# Patient Record
Sex: MALE | Race: WHITE | URBAN - METROPOLITAN AREA
[De-identification: names, ages, dates, MRNs, and addresses within clinical notes are randomized per-mention and may not be internally consistent; named-entity substitution may affect disease eponyms.]

---

## 2017-09-15 ENCOUNTER — TELEPHONE (OUTPATIENT)
Dept: ORTHOPEDIC SURGERY | Age: 42
End: 2017-09-15

## 2017-09-15 ENCOUNTER — OFFICE VISIT (OUTPATIENT)
Dept: ORTHOPEDIC SURGERY | Age: 42
End: 2017-09-15

## 2017-09-15 VITALS — BODY MASS INDEX: 24.25 KG/M2 | HEIGHT: 68 IN | WEIGHT: 160 LBS

## 2017-09-15 DIAGNOSIS — M21.822 HILL-SACHS LESION OF LEFT SHOULDER: ICD-10-CM

## 2017-09-15 DIAGNOSIS — S43.002A SUBLUXATION OF LEFT SHOULDER JOINT, INITIAL ENCOUNTER: ICD-10-CM

## 2017-09-15 PROCEDURE — 99203 OFFICE O/P NEW LOW 30 MIN: CPT | Performed by: PHYSICIAN ASSISTANT

## 2017-09-15 PROCEDURE — L3670 SO ACRO/CLAV CAN WEB PRE OTS: HCPCS | Performed by: PHYSICIAN ASSISTANT

## 2017-09-15 RX ORDER — SIMVASTATIN 40 MG
40 TABLET ORAL
COMMUNITY
Start: 2016-01-04

## 2017-11-07 NOTE — LETTER
To be performed within 30 days, unless otherwise noted. Patient Name: Mainor Espinal     [facility]      Fax to [phone] [address] [facility]    Fax to [phone]  YOB: 1975     [address] [facility]   Fax to [phone]                 Surgery confirmation #   Surgeon name:  Darryl Anaya.  Garrick Cedillo MD  Phone:  [phone]   Fax:  [phone]                  General/MAC/Regional Anesthesia Pre Admission Testing/Same Day Surgery RN to check if below criteria is met       ECG required - within 6 months of surgery if:           Diagnosis of CAD, arrhythmia, CHF, CRF, arterial vascular disease, pulmonary disease (except asthma), DM        PT/INR day of surgery required - if no documented INF of 1.1 or less within 48 hours of surgery if on Warfarin in the last 30 days       POCT Glucose day of surgery required - if diabetic       Potassium day of surgery required - if: 1) On dialysis or 2) Diagnosis of renal failure (not renal insufficiency or working transplant)       Urine pregnancy (Beta HCG if unable to void) on day of surgery, unless patient has negative serum pregnancy test within 7 days of  surgery           required if female with no history of hysterectomy and:     1) 11-55 years  2) Less than 11 years and has begun menses or 3) Greater than 55 years and less than one year post-menopausal       IV: Insert Peripheral IV day of surgery (and saline lock if needed per anesthesia)        Normal Saline @ 125 ml/hr (1000 ml bag) unless diagnosed with CRF then @ 50 ml/hr          Other IV ______________________    Local Anesthesia    Pre-operative consultation to evaluate for risk factors prior to surgery             per PCP, may use hospitalists if not available          per hospitalist   per surgeon: date ___________  __________________________________________________________________________________________________________________

## 2017-11-15 ENCOUNTER — TELEPHONE (OUTPATIENT)
Dept: ORTHOPEDIC SURGERY | Age: 42
End: 2017-11-15

## 2017-11-15 NOTE — ADDENDUM NOTE
Encounter addended by: Zach Schmidt MA on: 11/15/2017 10:20 AM<BR>    Actions taken: Letter status changed

## 2017-12-01 ENCOUNTER — HOSPITAL ENCOUNTER (OUTPATIENT)
Dept: PHYSICAL THERAPY | Age: 42
Discharge: HOME | End: 2017-12-31
Attending: ORTHOPAEDIC SURGERY | Admitting: ORTHOPAEDIC SURGERY

## 2017-12-12 ENCOUNTER — HOSPITAL ENCOUNTER (OUTPATIENT)
Dept: PHYSICAL THERAPY | Age: 42
Discharge: HOME | End: 2017-11-30
Admitting: ORTHOPAEDIC SURGERY

## 2017-12-12 ENCOUNTER — HOSPITAL ENCOUNTER (OUTPATIENT)
Dept: PHYSICAL THERAPY | Age: 42
Discharge: HOME | End: 2017-12-12
Admitting: ORTHOPAEDIC SURGERY

## 2017-12-12 ENCOUNTER — OFFICE VISIT (OUTPATIENT)
Dept: ORTHOPEDIC SURGERY | Age: 42
End: 2017-12-12

## 2017-12-12 VITALS — BODY MASS INDEX: 24.26 KG/M2 | WEIGHT: 160.05 LBS | HEIGHT: 68 IN

## 2017-12-12 DIAGNOSIS — Z98.890 S/P ARTHROSCOPY OF SHOULDER: ICD-10-CM

## 2017-12-12 DIAGNOSIS — M21.822 HILL-SACHS LESION OF LEFT SHOULDER: Primary | ICD-10-CM

## 2017-12-12 PROCEDURE — 99024 POSTOP FOLLOW-UP VISIT: CPT | Performed by: ORTHOPAEDIC SURGERY

## 2017-12-12 NOTE — PLAN OF CARE
[facility], [address]  Phone: [phone]  Fax [phone]     Physical Therapy Certification    Dear Referring Practitioner: Dr. Lalito Alston,    We had the pleasure of evaluating the following patient for physical therapy services at [address] [place] [address]. A summary of our findings can be found in the initial assessment below. This includes our plan of care. If you have any questions or concerns regarding these findings, please do not hesitate to contact me at the office phone number checked above. Thank you for the referral.       Physician Signature:_______________________________Date:__________________  By signing above (or electronic signature), therapists plan is approved by physician    Patient: Genny Sears   : 1975   MRN: [id number]  Referring Physician: Referring Practitioner: Dr. Lalito Alston      Evaluation Date: 2017      Medical Diagnosis Information:  Diagnosis: Z89.234 (ICD-10-CM) - Hill-Sachs lesion of left shoulder   Treatment Diagnosis: R shoulder pain (m25.512) s/p Latarjet-Nohemi procedure on L shoulder DOS 17                                         Insurance information: PT Insurance Information: BCBS    Precautions/ Contra-indications: See Latarjet protocol, 1 wk PO   Latex Allergy:  [x]NO      []YES  Preferred Language for Healthcare:   [x]English       []other:    SUBJECTIVE: Patient stated complaint: Initial shoulder injury was ~15 years ago snowboarding, then dislocated again skateboarding. Was a frequent dislocator. Pt states he stopped taking pain medication on Saturday. Uses ice machine frequently throughout the day. Saw Dr. Lalito Alston today who released him from the sling. Has zero pain most of the time.     Relevant Medical History:Initial L dislocation ~15 y/o  Functional Disability Index:PT G-Codes  Functional Assessment Tool Used: QuickDASH  Score: 70%   Functional Limitation: Carrying, moving and handling objects  Carrying, Moving and Handling Objects Current Status (): At least 60 percent but less than 80 percent impaired, limited or restricted  Carrying, Moving and Handling Objects Goal Status (): At least 20 percent but less than 40 percent impaired, limited or restricted    Pain Scale: 0-2/10  Easing factors: Ice  Provocative factors: sleeping, self care, positional changes, driving, lifting, overhead motion    Type: []Constant   [x]Intermittent  []Radiating []Localized []other:     Numbness/Tingling: Thumb and first finger    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    Living Status/Prior Level of Function: Independent with ADLs and IADLs; slow but independent now    OBJECTIVE:     ROM Left Right   Shoulder Flex 90 170   Shoulder Abd    Shoulder ER 20 deg @ neutral T5   Shoulder IR  T7                  Strength  Left Right   Shoulder Flex NT 5   Shoulder Scap NT 5   Shoulder ER NT 5   Shoulder IR NT 5               Reflexes/Sensation:   [x]Dermatomes/Myotomes intact    []Reflexes equal and normal bilaterally   []Other:    Joint mobility:   []Normal    [x]Hypo   []Hyper    Palpation: Tight through L UT,     Functional Mobility/Transfers: WFL    Posture: Good posture, slightly elevated L shoulder    Bandages/Dressings/Incisions:     Gait: (include devices/WB status): Normal gait                         [x] Patient history, allergies, meds reviewed. Medical chart reviewed. See intake form. Review Of Systems (ROS):  [x]Performed Review of systems (Integumentary, CardioPulmonary, Neurological) by intake and observation. Intake form has been scanned into medical record. Patient has been instructed to contact their primary care physician regarding ROS issues if not already being addressed at this time.       Co-morbidities/Complexities (which will affect course of rehabilitation):  [x]None           Arthritic conditions benefit from Dry needling     []other:     Prognosis/Rehab Potential:      []Excellent   [x]Good    []Fair   []Poor    Tolerance of evaluation/treatment:    []Excellent   [x]Good    []Fair   []Poor  Physical Therapy Evaluation Complexity Justification  [x] A history of present problem with:  [x] no personal factors and/or comorbidities that impact the plan of care;  []1-2 personal factors and/or comorbidities that impact the plan of care  []3 personal factors and/or comorbidities that impact the plan of care  [x] An examination of body systems using standardized tests and measures addressing any of the following: body structures and functions (impairments), activity limitations, and/or participation restrictions;:  [x] a total of 1-2 or more elements   [] a total of 3 or more elements   [] a total of 4 or more elements   [x] A clinical presentation with:  [] stable and/or uncomplicated characteristics   [x] evolving clinical presentation with changing characteristics  [] unstable and unpredictable characteristics;   [x] Clinical decision making of [x] low, [] moderate, [] high complexity using standardized patient assessment instrument and/or measurable assessment of functional outcome. [x] EVAL (LOW) 38659 (typically 20 minutes face-to-face)  [] EVAL (MOD) 61103 (typically 30 minutes face-to-face)  [] EVAL (HIGH) 34805 (typically 45 minutes face-to-face)  [] RE-EVAL       PLAN:  Frequency/Duration:  1-2 days per week for 10-12 Weeks:  INTERVENTIONS:  [x] Therapeutic exercise including: strength training, ROM, for Upper extremity and core   [x]  NMR activation and proprioception for UE, scap and Core   [x] Manual therapy as indicated for shoulder, scapula and spine to include: Dry Needling/IASTM, STM, PROM, Gr I-IV mobilizations, manipulation.    [x] Modalities as needed that may include: thermal agents, E-stim, Biofeedback, US, iontophoresis as indicated  [x] Patient education on joint protection, postural re-education, activity modification, progression of HEP. HEP instruction:(see flowsheet)    GOALS:  Patient stated goal: 'Have full mobility of arm and return to normal work function\"    Therapist goals for Patient:   Short Term Goals: To be achieved in: 2 weeks  1. Independent in HEP and progression per patient tolerance, in order to prevent re-injury. 2. Patient will have a decrease in pain to facilitate improvement in movement, function, and ADLs as indicated by Functional Deficits. Long Term Goals: To be achieved in: 12weeks  1. Disability index score of 35% or less for the [facility] to assist with reaching prior level of function. 2. Patient will demonstrate increased AROM to 160 deg flexion, 160 deg abduction, T4 ER, T8 IR to allow for proper joint functioning as indicated by patients Functional Deficits. 3. Patient will demonstrate an increase in Strength to 4+/5 into flexion, abduction, IR and ER to allow for proper functional mobility as indicated by patients Functional Deficits. 4. Patient will return to driving and reaching into cabinetswithout increased symptoms or restriction.    5. Patient will return to work related activities including lifting without any increased symptoms or restrictions.(patient specific functional goal)         Electronically signed by:  Erickson Headley, PT,DPT [id number]

## 2017-12-12 NOTE — FLOWSHEET NOTE
[facility], [address]  Phone: [phone]  Fax [phone]      Physical Therapy Daily Treatment Note  Date:  2017    Patient Name:  Natasha Lopez    :  1975  MRN: [id number]  Restrictions/Precautions:   Medical/Treatment Diagnosis Information:  Diagnosis: M21.822 (ICD-10-CM) - Hill-Sachs lesion of left shoulder  Treatment Diagnosis: R shoulder pain (m25.512) s/p Latarjet-Nohemi procedure on L shoulder DOS   Insurance/Certification information:  PT Insurance Information: [facility]  Physician Information:  Referring Practitioner: Dr. Stephen Tolbert of care signed (Y/N):     Date of Patient follow up with Physician: 18    G-Code (if applicable):      Date G-Code Applied:    PT G-Codes  Functional Assessment Tool Used: QuickDASH  Score: 70%   Functional Limitation: Carrying, moving and handling objects  Carrying, Moving and Handling Objects Current Status (): At least 60 percent but less than 80 percent impaired, limited or restricted  Carrying, Moving and Handling Objects Goal Status (): At least 20 percent but less than 40 percent impaired, limited or restricted    Progress Note: [x]  Yes  []  No  Next due by: Visit #10      Latex Allergy:  [x]NO      []YES  Preferred Language for Healthcare:   [x]English       []other:    Visit # Insurance Allowable Requires auth   1 60    [x]no        []yes:     Pain level:  0-2/10     SUBJECTIVE:  See eval    OBJECTIVE: See eval  Observation:   Test measurements:      RESTRICTIONS/PRECAUTIONS: PROM 90 deg flex, 20 deg ER @ 0 deg abd,; pt out of sling, advised to avoid active motion as able for first few weeks.     Exercises/Interventions:   Therapeutic Ex Sets/rep comments   UT stretch 30\" x 3    Scap squeeze 5\" x 15    Table slides flexion (90 deg) 10\" x 10    Self ER to neutral 10\" x 10    Elbow AROM 10x    Flexion iso 5\" x 10 Pt ed 5' Ice, POC, HEP,              Manual Intervention                                        NMR re-education                                                 Therapeutic Exercise and NMR EXR  [x] (00461) Provided verbal/tactile cueing for activities related to strengthening, flexibility, endurance, ROM  for improvements in scapular, scapulothoracic and UE control with self care, reaching, carrying, lifting, house/yardwork, driving/computer work.    [] (01205) Provided verbal/tactile cueing for activities related to improving balance, coordination, kinesthetic sense, posture, motor skill, proprioception  to assist with  scapular, scapulothoracic and UE control with self care, reaching, carrying, lifting, house/yardwork, driving/computer work. Therapeutic Activities:    [] (88425 or 98879) Provided verbal/tactile cueing for activities related to improving balance, coordination, kinesthetic sense, posture, motor skill, proprioception and motor activation to allow for proper function of scapular, scapulothoracic and UE control with self care, carrying, lifting, driving/computer work.      Home Exercise Program:    [x] (44415) Reviewed/Progressed HEP activities related to strengthening, flexibility, endurance, ROM of scapular, scapulothoracic and UE control with self care, reaching, carrying, lifting, house/yardwork, driving/computer work  [] (66336) Reviewed/Progressed HEP activities related to improving balance, coordination, kinesthetic sense, posture, motor skill, proprioception of scapular, scapulothoracic and UE control with self care, reaching, carrying, lifting, house/yardwork, driving/computer work      Manual Treatments:  PROM / STM / Oscillations-Mobs:  G-I, II, III, IV (PA's, Inf., Post.)  [] (72672) Provided manual therapy to mobilize soft tissue/joints of cervical/CT, scapular GHJ and UE for the purpose of modulating pain, promoting relaxation,  increasing ROM,

## 2017-12-14 ENCOUNTER — HOSPITAL ENCOUNTER (OUTPATIENT)
Dept: PHYSICAL THERAPY | Age: 42
Discharge: HOME | End: 2017-12-14
Admitting: ORTHOPAEDIC SURGERY

## 2017-12-14 NOTE — FLOWSHEET NOTE
[facility],  [address]  Phone: [phone]  Fax [phone]      Physical Therapy Daily Treatment Note  Date:  2017    Patient Name:  Jay Esquivel    :  1975  MRN: [id number]  Restrictions/Precautions:   Medical/Treatment Diagnosis Information:  Diagnosis: M21.822 (ICD-10-CM) - Hill-Sachs lesion of left shoulder  Treatment Diagnosis: R shoulder pain (m25.512) s/p Latarjet-Nohemi procedure on L shoulder DOS   Insurance/Certification information:  PT Insurance Information: [facility]  Physician Information:  Referring Practitioner: Dr. Jonathon Thomason of care signed (Y/N):     Date of Patient follow up with Physician: 18    G-Code (if applicable):      Date G-Code Applied:         Progress Note: [x]  Yes  []  No  Next due by: Visit #10      Latex Allergy:  [x]NO      []YES  Preferred Language for Healthcare:   [x]English       []other:    Visit # Insurance Allowable Requires auth   2 60    [x]no        []yes:     Pain level:  0/10     SUBJECTIVE:  Pt states he did the exercises and felt really good yesterday and today. Feels like his body is still reacting to protect the shoulder even though it has been operated on to avoid any dislocations. OBJECTIVE: See eval  Observation:    Test measurements:      RESTRICTIONS/PRECAUTIONS: PROM 90 deg flex, 20 deg ER @ 0 deg abd,; pt out of sling, advised to avoid active motion as able for first few weeks.     Exercises/Interventions:   Therapeutic Ex Sets/rep comments   UT stretch 30\" x 3    Levator stretch 30\" x 3    Scap squeeze 3\" x 10    Table slides flexion (90 deg) 10\" x 10    Self ER to neutral 10\" x 10    Shoulder shrug 5\" x 10         Supine cane press 2 x 10                                                 Pt ed 5' Ice, POC, HEP,              Manual Intervention     STM/TPR to UT, STM to bicep/lateral arm, PROM flex (<90) and ER (<20) 14' Guarding with ER today                                 NMR re-education     Isometrics flexion, ER and IR 5\" x 10                                           Therapeutic Exercise and NMR EXR  [x] (39310) Provided verbal/tactile cueing for activities related to strengthening, flexibility, endurance, ROM  for improvements in scapular, scapulothoracic and UE control with self care, reaching, carrying, lifting, house/yardwork, driving/computer work.    [] (05988) Provided verbal/tactile cueing for activities related to improving balance, coordination, kinesthetic sense, posture, motor skill, proprioception  to assist with  scapular, scapulothoracic and UE control with self care, reaching, carrying, lifting, house/yardwork, driving/computer work. Therapeutic Activities:    [] (23260 or 20130) Provided verbal/tactile cueing for activities related to improving balance, coordination, kinesthetic sense, posture, motor skill, proprioception and motor activation to allow for proper function of scapular, scapulothoracic and UE control with self care, carrying, lifting, driving/computer work.      Home Exercise Program:    [x] (42013) Reviewed/Progressed HEP activities related to strengthening, flexibility, endurance, ROM of scapular, scapulothoracic and UE control with self care, reaching, carrying, lifting, house/yardwork, driving/computer work  [] (25932) Reviewed/Progressed HEP activities related to improving balance, coordination, kinesthetic sense, posture, motor skill, proprioception of scapular, scapulothoracic and UE control with self care, reaching, carrying, lifting, house/yardwork, driving/computer work      Manual Treatments:  PROM / STM / Oscillations-Mobs:  G-I, II, III, IV (PA's, Inf., Post.)  [] (51241) Provided manual therapy to mobilize soft tissue/joints of cervical/CT, scapular GHJ and UE for the purpose of modulating pain, promoting relaxation,  increasing ROM, reducing/eliminating soft tissue Patient is progressing as expected towards functional goals listed. [] Progression is slowed due to complexities listed. [] Progression has been slowed due to co-morbidities. [x] Plan just implemented, too soon to assess goals progression  [] Other:     ASSESSMENT:  Pt was educated on range restrictions again and verbalized understanding and not to push table slide flexion at this point in time. He did well with added isometrics and gentle active motion.       Treatment/Activity Tolerance:  [x] Patient tolerated treatment well [] Patient limited by fatique  [] Patient limited by pain  [] Patient limited by other medical complications  [] Other:     Prognosis: [x] Good [] Fair  [] Poor    Patient Requires Follow-up: [x] Yes  [] No    PLAN: Cont to work on muscle re-education w/ isos and gentle AAROM  [x] Continue per plan of care [] Alter current plan (see comments)  [] Plan of care initiated [] Hold pending MD visit [] Discharge    Electronically signed by: Parth Burrows PT,DPT [id number]

## 2017-12-18 ENCOUNTER — HOSPITAL ENCOUNTER (OUTPATIENT)
Dept: PHYSICAL THERAPY | Age: 42
Discharge: HOME | End: 2017-12-18
Admitting: ORTHOPAEDIC SURGERY

## 2017-12-18 NOTE — FLOWSHEET NOTE
Therapeutic Exercise and NMR EXR  [x] (75459) Provided verbal/tactile cueing for activities related to strengthening, flexibility, endurance, ROM  for improvements in scapular, scapulothoracic and UE control with self care, reaching, carrying, lifting, house/yardwork, driving/computer work.    [] (20235) Provided verbal/tactile cueing for activities related to improving balance, coordination, kinesthetic sense, posture, motor skill, proprioception  to assist with  scapular, scapulothoracic and UE control with self care, reaching, carrying, lifting, house/yardwork, driving/computer work. Therapeutic Activities:    [] (74644 or 10055) Provided verbal/tactile cueing for activities related to improving balance, coordination, kinesthetic sense, posture, motor skill, proprioception and motor activation to allow for proper function of scapular, scapulothoracic and UE control with self care, carrying, lifting, driving/computer work.      Home Exercise Program:    [x] (22974) Reviewed/Progressed HEP activities related to strengthening, flexibility, endurance, ROM of scapular, scapulothoracic and UE control with self care, reaching, carrying, lifting, house/yardwork, driving/computer work  [] (06486) Reviewed/Progressed HEP activities related to improving balance, coordination, kinesthetic sense, posture, motor skill, proprioception of scapular, scapulothoracic and UE control with self care, reaching, carrying, lifting, house/yardwork, driving/computer work      Manual Treatments:  PROM / STM / Oscillations-Mobs:  G-I, II, III, IV (PA's, Inf., Post.)  [] (82862) Provided manual therapy to mobilize soft tissue/joints of cervical/CT, scapular GHJ and UE for the purpose of modulating pain, promoting relaxation,  increasing ROM, reducing/eliminating soft tissue swelling/inflammation/restriction, improving soft tissue extensibility and allowing for proper ROM for normal function with self care, reaching, carrying, lifting, house/yardwork, driving/computer work    Modalities:  PM/CP 15 min to L shoulder in long sitting    Charges:  Timed Code Treatment Minutes: 40   Total Treatment Minutes: 55     [] EVAL (LOW) 58295 (typically 20 minutes face-to-face)  [] EVAL (MOD) 57943 (typically 30 minutes face-to-face)  [] EVAL (HIGH) 18373 (typically 45 minutes face-to-face)  [] RE-EVAL     [x] GN(58322) x  2   [] IONTO  [] NMR (59029) x      [] VASO  [x] Manual (50443) x  1    [] Other:  [] TA x       [] Mech Traction (48059)  [] ES(attended) (42590)      [x] ES (un) (66320):     GOALS:  Patient stated goal: 'Have full mobility of arm and return to normal work function\"    Therapist goals for Patient:   Short Term Goals: To be achieved in: 2 weeks  1. Independent in HEP and progression per patient tolerance, in order to prevent re-injury. 2. Patient will have a decrease in pain to facilitate improvement in movement, function, and ADLs as indicated by Functional Deficits. Long Term Goals: To be achieved in: 12weeks  1. Disability index score of 35% or less for the [facility] to assist with reaching prior level of function. 2. Patient will demonstrate increased AROM to 160 deg flexion, 160 deg abduction, T4 ER, T8 IR to allow for proper joint functioning as indicated by patients Functional Deficits. 3. Patient will demonstrate an increase in Strength to 4+/5 into flexion, abduction, IR and ER to allow for proper functional mobility as indicated by patients Functional Deficits. 4. Patient will return to driving and reaching into cabinetswithout increased symptoms or restriction. 5. Patient will return to work related activities including lifting without any increased symptoms or restrictions.(patient specific functional goal)         Progression Towards Functional goals:  [] Patient is progressing as expected towards functional goals listed. [] Progression is slowed due to complexities listed.   [] Progression has been slowed due to co-morbidities. [x] Plan just implemented, too soon to assess goals progression  [] Other:     ASSESSMENT:  Pt did well with added TE challenges. Was advised to follow up with surgeon's office about driving.       Treatment/Activity Tolerance:  [x] Patient tolerated treatment well [] Patient limited by fatique  [] Patient limited by pain  [] Patient limited by other medical complications  [] Other:     Prognosis: [x] Good [] Fair  [] Poor    Patient Requires Follow-up: [x] Yes  [] No    PLAN: Cont to work on AROM  [x] Continue per plan of care [] Alter current plan (see comments)  [] Plan of care initiated [] Hold pending MD visit [] Discharge    Electronically signed by: Pricila Wen, PT,DPT [id number]

## 2017-12-20 ENCOUNTER — HOSPITAL ENCOUNTER (OUTPATIENT)
Dept: PHYSICAL THERAPY | Age: 42
Discharge: HOME | End: 2017-12-20
Admitting: ORTHOPAEDIC SURGERY

## 2017-12-20 NOTE — FLOWSHEET NOTE
Patient is progressing as expected towards functional goals listed. [] Progression is slowed due to complexities listed. [] Progression has been slowed due to co-morbidities. [x] Plan just implemented, too soon to assess goals progression  [] Other:     ASSESSMENT:  Pt did well with added side lying activities. Provided updated HEP for pt to focus on at home. Doing very well so far.     Treatment/Activity Tolerance:  [x] Patient tolerated treatment well [] Patient limited by fatique  [] Patient limited by pain  [] Patient limited by other medical complications  [] Other:     Prognosis: [x] Good [] Fair  [] Poor    Patient Requires Follow-up: [x] Yes  [] No    PLAN: Cont to work on motion  [x] Continue per plan of care [] Alter current plan (see comments)  [] Plan of care initiated [] Hold pending MD visit [] Discharge    Electronically signed by: Steven Hogue, PT,DPT [id number]

## 2017-12-27 ENCOUNTER — HOSPITAL ENCOUNTER (OUTPATIENT)
Dept: PHYSICAL THERAPY | Age: 42
Discharge: HOME | End: 2017-12-27
Admitting: ORTHOPAEDIC SURGERY

## 2017-12-27 NOTE — FLOWSHEET NOTE
Progression has been slowed due to co-morbidities. [x] Plan just implemented, too soon to assess goals progression  [] Other:     ASSESSMENT:  Pt doing very well with all exercises at this point in time. Should continue to progress well, was educated on avoiding catching/lifting his children.     Treatment/Activity Tolerance:  [x] Patient tolerated treatment well [] Patient limited by fatique  [] Patient limited by pain  [] Patient limited by other medical complications  [] Other:     Prognosis: [x] Good [] Fair  [] Poor    Patient Requires Follow-up: [x] Yes  [] No    PLAN:wall slides and tricep strengthening  [x] Continue per plan of care [] Alter current plan (see comments)  [] Plan of care initiated [] Hold pending MD visit [] Discharge    Electronically signed by: Pricila Wen, PT,DPT [id number]

## 2017-12-29 ENCOUNTER — HOSPITAL ENCOUNTER (OUTPATIENT)
Dept: PHYSICAL THERAPY | Age: 42
Discharge: HOME | End: 2017-12-29
Admitting: ORTHOPAEDIC SURGERY

## 2017-12-29 NOTE — FLOWSHEET NOTE
[facility], [address] [place]  Phone: [phone]  Fax [phone]      Physical Therapy Daily Treatment Note  Date:  2017    Patient Name:  Abimbola Stapleton    :  1975  MRN: [id number]  Restrictions/Precautions:   Medical/Treatment Diagnosis Information:  Diagnosis: M21.822 (ICD-10-CM) - Hill-Sachs lesion of left shoulder  Treatment Diagnosis: R shoulder pain (m25.512) s/p Latarjet-Nohemi procedure on L shoulder DOS 53  Insurance/Certification information:  PT Insurance Information: [facility]  Physician Information:  Referring Practitioner: Dr. Oc Glynn of care signed (Y/N):     Date of Patient follow up with Physician: 18    G-Code (if applicable):      Date G-Code Applied:         Progress Note: [x]  Yes  []  No  Next due by: Visit #10      Latex Allergy:  [x]NO      []YES  Preferred Language for Healthcare:   [x]English       []other:    Visit # Insurance Allowable Requires auth   6 60    [x]no        []yes:     Pain level:  10     SUBJECTIVE: Pt states exercises are going well.     OBJECTIVE: See eval  Observation:    Test measurements:      RESTRICTIONS/PRECAUTIONS:  Gentle ROM; no weighted lifting    Exercises/Interventions:    Therapeutic Ex Sets/rep comments   UT stretch 30\" x 3    Levator stretch 30\" x 3              Self ER w/ cane 10\" x 10 20 deg   Wall slide flex 5\" x 10    GTB rows 2 x 10 +HEP   Supine cane press w/ flex  2 x 10    Supine shoulder flexion (short lever) 2 x 10    Side lying ER 2 x 10 x 3\"    Side lying abduction 2 x 10   Eccentric lowering   Finisher ladder and arches both ways 10x ea    Pulleys 4'    YTB ext 2 x 10                   Pt ed 5' Ice, POC, HEP,              Manual Intervention     STM to pec/ lateral arm, PROM flex and ER, post and inf mobs G2 13                                  NMR re-education Therapeutic Exercise and NMR EXR  [x] (14599) Provided verbal/tactile cueing for activities related to strengthening, flexibility, endurance, ROM  for improvements in scapular, scapulothoracic and UE control with self care, reaching, carrying, lifting, house/yardwork, driving/computer work.    [] (52259) Provided verbal/tactile cueing for activities related to improving balance, coordination, kinesthetic sense, posture, motor skill, proprioception  to assist with  scapular, scapulothoracic and UE control with self care, reaching, carrying, lifting, house/yardwork, driving/computer work. Therapeutic Activities:    [] (34079 or 52306) Provided verbal/tactile cueing for activities related to improving balance, coordination, kinesthetic sense, posture, motor skill, proprioception and motor activation to allow for proper function of scapular, scapulothoracic and UE control with self care, carrying, lifting, driving/computer work.      Home Exercise Program:    [x] (84165) Reviewed/Progressed HEP activities related to strengthening, flexibility, endurance, ROM of scapular, scapulothoracic and UE control with self care, reaching, carrying, lifting, house/yardwork, driving/computer work  [] (06183) Reviewed/Progressed HEP activities related to improving balance, coordination, kinesthetic sense, posture, motor skill, proprioception of scapular, scapulothoracic and UE control with self care, reaching, carrying, lifting, house/yardwork, driving/computer work      Manual Treatments:  PROM / STM / Oscillations-Mobs:  G-I, II, III, IV (PA's, Inf., Post.)  [x] (60674) Provided manual therapy to mobilize soft tissue/joints of cervical/CT, scapular GHJ and UE for the purpose of modulating pain, promoting relaxation,  increasing ROM, reducing/eliminating soft tissue swelling/inflammation/restriction, improving soft tissue extensibility and allowing for proper ROM for normal function with self care, reaching, carrying, slowed due to co-morbidities. [] Plan just implemented, too soon to assess goals progression  [] Other:     ASSESSMENT:  Pt progressing well with therapy. Did have increased tightness at pec today and is most limited into ER ROM.     Treatment/Activity Tolerance:  [x] Patient tolerated treatment well [] Patient limited by fatique  [] Patient limited by pain  [] Patient limited by other medical complications  [] Other:     Prognosis: [x] Good [] Fair  [] Poor    Patient Requires Follow-up: [x] Yes  [] No    PLAN:tricep strengthening  [x] Continue per plan of care [] Alter current plan (see comments)  [] Plan of care initiated [] Hold pending MD visit [] Discharge    Electronically signed by: Ariadne Schilling PT,DPT [id number]

## 2018-01-01 ENCOUNTER — HOSPITAL ENCOUNTER (OUTPATIENT)
Dept: PHYSICAL THERAPY | Age: 43
Discharge: HOME | End: 2018-01-31
Attending: ORTHOPAEDIC SURGERY | Admitting: ORTHOPAEDIC SURGERY

## 2018-01-03 ENCOUNTER — HOSPITAL ENCOUNTER (OUTPATIENT)
Dept: PHYSICAL THERAPY | Age: 43
Discharge: HOME | End: 2018-01-03
Admitting: ORTHOPAEDIC SURGERY

## 2018-01-03 NOTE — FLOWSHEET NOTE
swelling/inflammation/restriction, improving soft tissue extensibility and allowing for proper ROM for normal function with self care, reaching, carrying, lifting, house/yardwork, driving/computer work    Modalities:  PM/CP 15 min to L shoulder in long sitting    Charges:  Timed Code Treatment Minutes: 43   Total Treatment Minutes: 58     [] EVAL (LOW) 94709 (typically 20 minutes face-to-face)  [] EVAL (MOD) 71942 (typically 30 minutes face-to-face)  [] EVAL (HIGH) 40949 (typically 45 minutes face-to-face)  [] RE-EVAL     [x] QQ(71271) x  2   [] IONTO  [] NMR (38420) x      [] VASO  [x] Manual (17534) x  1    [] Other:  [] TA x       [] Mech Traction (39660)  [] ES(attended) (54985)      [x] ES (un) (77791):     GOALS:  Patient stated goal: 'Have full mobility of arm and return to normal work function\"    Therapist goals for Patient:   Short Term Goals: To be achieved in: 2 weeks  1. Independent in HEP and progression per patient tolerance, in order to prevent re-injury. 2. Patient will have a decrease in pain to facilitate improvement in movement, function, and ADLs as indicated by Functional Deficits. Long Term Goals: To be achieved in: 12weeks  1. Disability index score of 35% or less for the [facility] to assist with reaching prior level of function. 2. Patient will demonstrate increased AROM to 160 deg flexion, 160 deg abduction, T4 ER, T8 IR to allow for proper joint functioning as indicated by patients Functional Deficits. 3. Patient will demonstrate an increase in Strength to 4+/5 into flexion, abduction, IR and ER to allow for proper functional mobility as indicated by patients Functional Deficits. 4. Patient will return to driving and reaching into cabinetswithout increased symptoms or restriction.    5. Patient will return to work related activities including lifting without any increased symptoms or restrictions.(patient specific functional goal)         Progression Towards Functional goals:  [x] Patient is progressing as expected towards functional goals listed. [] Progression is slowed due to complexities listed. [] Progression has been slowed due to co-morbidities. [] Plan just implemented, too soon to assess goals progression  [] Other:     ASSESSMENT:  Pt did well with all TE today. Did note that he has most soreness with ER in side lying at home.     Treatment/Activity Tolerance:  [x] Patient tolerated treatment well [] Patient limited by fatique  [] Patient limited by pain  [] Patient limited by other medical complications  [] Other:     Prognosis: [x] Good [] Fair  [] Poor    Patient Requires Follow-up: [x] Yes  [] No    PLAN: weight bearing at wall  [x] Continue per plan of care [] Alter current plan (see comments)  [] Plan of care initiated [] Hold pending MD visit [] Discharge    Electronically signed by: Laci Jackson, PT,DPT [id number]

## 2018-01-05 ENCOUNTER — HOSPITAL ENCOUNTER (OUTPATIENT)
Dept: PHYSICAL THERAPY | Age: 43
Discharge: HOME | End: 2018-01-05
Admitting: ORTHOPAEDIC SURGERY

## 2018-01-05 NOTE — FLOWSHEET NOTE
goals:  [x] Patient is progressing as expected towards functional goals listed. [] Progression is slowed due to complexities listed. [] Progression has been slowed due to co-morbidities. [] Plan just implemented, too soon to assess goals progression  [] Other:     ASSESSMENT:  Pt progressing well with therapy. We discussed the normal course of recovery and that we could reassess goals at the next visit.     Treatment/Activity Tolerance:  [x] Patient tolerated treatment well [] Patient limited by fatique  [] Patient limited by pain  [] Patient limited by other medical complications  [] Other:     Prognosis: [x] Good [] Fair  [] Poor    Patient Requires Follow-up: [x] Yes  [] No    PLAN: Reassess goals  [x] Continue per plan of care [] Alter current plan (see comments)  [] Plan of care initiated [] Hold pending MD visit [] Discharge    Electronically signed by: Maci Escobar PT,DPT [id number]

## 2018-01-10 ENCOUNTER — HOSPITAL ENCOUNTER (OUTPATIENT)
Dept: PHYSICAL THERAPY | Age: 43
Discharge: HOME | End: 2018-01-10
Admitting: ORTHOPAEDIC SURGERY

## 2018-01-10 NOTE — FLOWSHEET NOTE
[facility], [address] [place]  Phone: [phone]  Fax [phone]      Physical Therapy Daily Treatment Note  Date:  1/10/2018    Patient Name:  Irving Davila    :  1975  MRN: [id number]  Restrictions/Precautions:   Medical/Treatment Diagnosis Information:  Diagnosis: M21.822 (ICD-10-CM) - Hill-Sachs lesion of left shoulder  Treatment Diagnosis: R shoulder pain (m25.512) s/p Latarjet-Nohemi procedure on L shoulder DOS   Insurance/Certification information:  PT Insurance Information: [facility]  Physician Information:  Referring Practitioner: Dr. Irene Zamora of care signed (Y/N):     Date of Patient follow up with Physician: 18    G-Code (if applicable):      Date G-Code Applied:         Progress Note: [x]  Yes  []  No  Next due by: Visit #10      Latex Allergy:  [x]NO      []YES  Preferred Language for Healthcare:   [x]English       []other:    Visit # Insurance Allowable Requires auth   9 60    [x]no        []yes:     Pain level:  2/10     SUBJECTIVE: Pt states yesterday  A few things fell off a shelf at work and he stopped them from hitting him on the head with his arms. Was a little sore from it. OBJECTIVE: Pt educated on not pushing range.   Observation:    Test measurements:  Updated POC next visit    RESTRICTIONS/PRECAUTIONS:  Gentle ROM; no weighted lifting    Exercises/Interventions:    Therapeutic Ex Sets/rep comments   UT stretch 30\" x 3    Levator stretch 30\" x 3         Cane press and flex 5\" x 10 155 deg   Cane ER 10\" x 10 55 deg @45 deg abd   Wall slide flex w/ lift off and eccentric lower 5\" x 10    GTB rows 2 x 10 +HEP   RTB IR/ER 2 x 10 Cues for form   Supine shoulder flexion; short lever and long lever 15 x ea    Side lying ER 1# 2 x 10 x 3\" Cues for reaching neutral   Side lying abduction 1# 2 x 10   Eccentric lowering   IR strap stretch 30\" x 3       RTB ext 2 x 10    GTB

## 2018-01-12 ENCOUNTER — HOSPITAL ENCOUNTER (OUTPATIENT)
Dept: PHYSICAL THERAPY | Age: 43
Discharge: HOME | End: 2018-01-12
Admitting: ORTHOPAEDIC SURGERY

## 2018-01-12 NOTE — PLAN OF CARE
[facility], [address] [facility]  [address] [place] [address], [address] [place] [address]  Phone: [phone]  Fax [phone]     Physical Therapy Re-Certification Plan of Care    Dear Dr. Will Gomez  ,    We had the pleasure of treating the following patient for physical therapy services at [address] [place] [address]. A summary of our findings can be found in the updated assessment below. This includes our plan of care. If you have any questions or concerns regarding these findings, please do not hesitate to contact me at the office phone number checked above. Thank you for the referral.     Physician Signature:________________________________Date:__________________  By signing above, therapists plan is approved by physician      Patient: Fritz Roles   : 1975   MRN: [id number]  Referring Physician:        Evaluation Date: 2018      Medical Diagnosis Information:  ·   N50.909 (ICD-10-CM) - Hill-Sachs lesion of left shoulder    R shoulder pain (m25.512) s/p Latarjet-Nohemi procedure on L shoulder DOS 17Insurance information:      Date Range:17-18  Total visits:10      SUBJECTIVE: Pt reports he feels things are getting better. 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 Has had some incidents of twinges at the shoulder muscles but overall is relatively pain free. Is able to sleep through night and perform daily activities without any pain. Current Pain Scale: 0-3/10    Type: []Constant   [x]Intermitment  []Radiating []Localized  []other:     Functional Limitations: [x]Lifting/reaching []Grooming  [x]Carrying []ADL's  []Driving [x]Sports/Recreations   []Other:      OBJECTIVE:  Shoulder flexion: 140 deg  Shoulder external rotation: 40 deg @ 90 abd    Strength: Not tested d/t postop protocol    Joint mobility:    [x]Normal    []Hypo   []Hyper      ASSESSMENT:Pete is progressing well with therapy.   He is tolerating passive motion above 90 deg well and performing supine active flexion to 90 deg w/o strain or pain. Scapular strength is good and pt has good form with all exercises. I have educated him on not pushing end range of motion. Response to Treatment:   [x]Patient is responding well to treatment and improvement is noted with regards  to goals   []Patient should continue to improve in reasonable time if they continue HEP   []Patient has plateaued and is no longer responding to skilled PT intervention    []Patient is getting worse and would benefit from return to referring MD   []Patient unable to adhere to initial POC      Functional deficiencies which affect ADL's and Reduce overall functional level:     [x]decreased RC/scapular strength and neuromuscular control - Reduced overall  functional level with carrying /lifting   [x]decreased UE ROM/joint mobility- Reduced overall functional level with  carrying /lifting    []pain/difficulty with driving and/or computer work- Reduced overall functional  level    []pain at end of day with ADL tasks- Reduced overall functional level   [x]pain/difficulty with lifting/reaching/carrying - Reduced overall functional level  with carrying and lifting   [x]unable to perform sport/recreational activity due to pain and dysfunction   []other:       Prognosis/Rehab Potential:    []Excellent   [x]Good    []Fair   []Poor: Toleration of evaluation or treatment:    []Excellent   [x]Good    []Fair   []Poor     New or Updated Goals (if applicable):  [x] No change to goals established upon initial eval/last progress note:  New Goals:    GOALS:   Patient stated goal: 'Have full mobility of arm and return to normal work function\"     Therapist goals for Patient:   Short Term Goals: To be achieved in: 2 weeks  1. Independent in HEP and progression per patient tolerance, in order to prevent re-injury.    2. Patient will have a decrease in pain to facilitate improvement in movement, function, and ADLs as indicated by Functional Deficits.     Long Term Goals: To be achieved in: 12weeks  1. Disability index score of 35% or less for the [facility] to assist with reaching prior level of function. 2. Patient will demonstrate increased AROM to 160 deg flexion, 160 deg abduction, T4 ER, T8 IR to allow for proper joint functioning as indicated by patients Functional Deficits. progressing  3. Patient will demonstrate an increase in Strength to 4+/5 into flexion, abduction, IR and ER to allow for proper functional mobility as indicated by patients Functional Deficits. progressing  4. Patient will return to driving and reaching into cabinetswithout increased symptoms or restriction. met  5. Patient will return to work related activities including lifting without any increased symptoms or restrictions.(patient specific functional goal)      Rehab Potential:   []Excellent   [x] Good   [] Fair   [] Poor    Plan of Care:  [x] Continue Current Therapy Intervention    Frequency/Duration: 1-2 days per week for 4-6 Weeks:  HEP instruction: See scanned forms  1. Therapeutic exercise including: strength training, ROM, NMR and proprioception for the scapula, core and Upper extremity  2. Manual therapy as indicated including Dry Needling/IASTM, STM, PROM, Gr I-IV mobilizations, spinal mobilization/manipulation. 3. Modalities as needed including: thermal agents, E-stim, US, iontophoresis as indicated. 4. Patient education on joint protection, activity modification, progression of HEP.        Electronically signed by:  Donavan Huntley PT,DPT [id number]

## 2018-01-12 NOTE — FLOWSHEET NOTE
[facility], [address] [place]  Phone: [phone]  Fax [phone]      Physical Therapy Daily Treatment Note  Date:  2018    Patient Name:  Wellington Richardson    :  1975  MRN: [id number]  Restrictions/Precautions:   Medical/Treatment Diagnosis Information:  Diagnosis: M21.822 (ICD-10-CM) - Hill-Sachs lesion of left shoulder  Treatment Diagnosis: R shoulder pain (m25.512) s/p Latarjet-[place] procedure on L shoulder DOS 48/0/  Insurance/Certification information:  PT Insurance Information: [facility]  Physician Information:  Referring Practitioner: Dr. Jevon Gomez of care signed (Y/N):     Date of Patient follow up with Physician: 18    G-Code (if applicable):      Date G-Code Applied:         Progress Note: [x]  Yes  []  No  Next due by: Visit #10      Latex Allergy:  [x]NO      []YES  Preferred Language for Healthcare:   [x]English       []other:    Visit # Insurance Allowable Requires auth   10 60    [x]no        []yes:     Pain level: 3/10     SUBJECTIVE: Pt states he felt a twinge Thursday night, not proceeded by any particular incident. Felt really achey. Feels less pain today but can still feel a little pull. OBJECTIVE: Pt educated on not pushing range.   Observation:    Test measurements:140 deg flexion passive, 45 deg passive ER @ 90 deg abd    RESTRICTIONS/PRECAUTIONS:  Gentle ROM; no weighted lifting    Exercises/Interventions:    Therapeutic Ex Sets/rep comments   UT stretch 30\" x 3    Levator stretch 30\" x 3         Cane press and flex 5\" x 10    Cane ER 10\" x 10       GTB rows 2 x 10 +HEP   Supine shoulder flexion; long lever 2 x 10    Side lying ER 2 x 10 x 3\" Cues for reaching neutral   Side lying abduction  2 x 10   Eccentric lowering      Pulleys 4'    RTB ext 2 x 10    RTB tricep ext 2 x 10       No money YTB w/ scap squeeze 2 x 10 x 3\" Cues for scap   Pt ed 5' Ice, POC, HEP,

## 2018-01-17 ENCOUNTER — HOSPITAL ENCOUNTER (OUTPATIENT)
Dept: PHYSICAL THERAPY | Age: 43
Discharge: HOME | End: 2018-01-17
Admitting: ORTHOPAEDIC SURGERY

## 2018-01-22 ENCOUNTER — HOSPITAL ENCOUNTER (OUTPATIENT)
Dept: PHYSICAL THERAPY | Age: 43
Discharge: HOME | End: 2018-01-22
Admitting: ORTHOPAEDIC SURGERY

## 2018-01-22 NOTE — OP NOTE
[facility], [address] [place]  Phone: [phone]  Fax [phone]    Date: 1/22/2018  Physician: Dr. Darell Mayes  Patient: Malon Dancer Diagnosis: : R shoulder pain (m25.512) s/p Latarjet-[place] procedure on L shoulder DOS 12/6/17    Patient has received 13 sessions of Physical Therapy over a 6 week period. Functional Questionnaire Score: Initial 70%, Current 27%  Pain reported has decreased from 2/10 to 0/10    ROM Initial (L) Current (L)   Flexion 90 157 deg   Abduction  deg   ER 20 deg @ neutral T3   IR NT T9        Strength     Flexion NT 4/5   Scaption NT 4/5   IR NT 4/5   ER NT 4/5          Functional Activity Checklist: The patient continues to have moderate difficulty with the following:   [] Personal care  [] Reaching / overhead  [] Standing    [] Housework chores  [] Climbing  [] Driving / riding in a vehicle    [x] Work  [] Squatting  [] Bed / vehicle mobility    [x] Lifting  [] Walking  [] Sleeping    [] Pushing / pulling  [] Sitting  [] Concentrating / reading     Specific Functional Improvement and Impression:  Zack Ernst has progressed very well with physical therapy. His motion both passively and actively has improved as well as his strength. 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 He is appropriate to transition to additional strengthening at this time in order to return to work activities. Summary:   [x] Patient is progressing as expected for this condition   [] Patient is progressing, but slower than expected for this condition   [] Patient is not progressing  Clinician Recommendations:   [x] Continue rehabilitation due to objective improvement and continued functional deficits.    [] Follow up periodically to advance home exercise

## 2018-01-23 ENCOUNTER — OFFICE VISIT (OUTPATIENT)
Dept: ORTHOPEDIC SURGERY | Age: 43
End: 2018-01-23

## 2018-01-23 VITALS — WEIGHT: 160.05 LBS | HEIGHT: 68 IN | BODY MASS INDEX: 24.26 KG/M2

## 2018-01-23 DIAGNOSIS — M21.822 HILL-SACHS LESION OF LEFT SHOULDER: Primary | ICD-10-CM

## 2018-01-23 PROCEDURE — 99024 POSTOP FOLLOW-UP VISIT: CPT | Performed by: ORTHOPAEDIC SURGERY

## 2018-01-25 ENCOUNTER — HOSPITAL ENCOUNTER (OUTPATIENT)
Dept: PHYSICAL THERAPY | Age: 43
Discharge: HOME | End: 2018-01-25
Admitting: ORTHOPAEDIC SURGERY

## 2018-01-25 NOTE — FLOWSHEET NOTE
[facility], [address] [place]  Phone: [phone]  Fax [phone]    ATHLETIC TRAINING [address]  Date:  2018    Patient Name:  Gracia Godwin    :  1975  MRN: [id number]  Restrictions/Precautions:    Medical/Treatment Diagnosis Information:  ·   Diagnosis: R24.922 (ICD-10-CM) - Hill-Sachs lesion of left shoulder  ·   Treatment Diagnosis: R shoulder pain (m25.512) s/p Latarjet-[place] procedure on L shoulder DOS 17  Physician Information:    Dr. Duane Lovett  2wks    4 wks 6 wks 8 wks   3wks 6 wks 9 wks 12 wks                        Activity Log                                                          DOS/DOI: 17                                                         Date: 18   ATC Communication:  [employer]  PJF released to lift at work. HEP - instructed to lift no more than 20# to waist ht       [place]      Chop                         Chest                         ER Flip    Box lift waiste - floor 17# x10 (VC posture/scap dep)   Long/Short lever  Flex. Scap.                                 ABd.         punch        Body/Cardio Blade Flex/Ext                                    IR/ER                                    ABd/ADd        Push-up      Plus                           Wall                         Table                        Floor        Ball on Wall                Tramp        Theraband    Rows/Ext                          IR                          ER                          No money                          Horiz.  ABd                          Biceps                          Triceps                          Punches        Cable Column   Rows 7pl 2x10                              Ext. 6pl 2x10                              Lat. Pulldown 8pl 2x10                              Biceps
return to work related activities including lifting without any increased symptoms or restrictions.(patient specific functional goal)  progressing       Progression Towards Functional goals:  [x] Patient is progressing as expected towards functional goals listed. [] Progression is slowed due to complexities listed. [] Progression has been slowed due to co-morbidities. [] Plan just implemented, too soon to assess goals progression  [] Other:     ASSESSMENT:  Patient tolerated TE well. Discussed easing back into more activity at work. Instructed not to lift > 20 pounds at this time. Will continue to test out higher level activity.      Treatment/Activity Tolerance:  [x] Patient tolerated treatment well [] Patient limited by fatique  [] Patient limited by pain  [] Patient limited by other medical complications  [] Other:     Prognosis: [x] Good [] Fair  [] Poor    Patient Requires Follow-up: [x] Yes  [] No    PLAN: Continue to strengthen   [x] Continue per plan of care [] Alter current plan (see comments)  [] Plan of care initiated [] Hold pending MD visit [] Discharge    Electronically signed by: Galdino Flaherty, PT,DPT [id number]

## 2018-01-30 ENCOUNTER — HOSPITAL ENCOUNTER (OUTPATIENT)
Dept: PHYSICAL THERAPY | Age: 43
Discharge: HOME | End: 2018-01-30
Admitting: ORTHOPAEDIC SURGERY

## 2018-01-30 NOTE — FLOWSHEET NOTE
[facility], [address] [place]  Phone: [phone]  Fax [phone]      Physical Therapy Daily Treatment Note  Date:  2018    Patient Name:  Chel Cho    :  1975  MRN: [id number]  Restrictions/Precautions:   Medical/Treatment Diagnosis Information:  Diagnosis: M21.822 (ICD-10-CM) - Hill-Sachs lesion of left shoulder  Treatment Diagnosis: R shoulder pain (m25.512) s/p Latarjet-Nohemi procedure on L shoulder DOS   Insurance/Certification information:  PT Insurance Information: [facility]  Physician Information:  Referring Practitioner: Dr. Raynold Claude of care signed (Y/N): Yes    Date of Patient follow up with Physician: 18    G-Code (if applicable):      Date G-Code Applied:  18       Progress Note: [x]  Yes  []  No  Next due by: Visit #10      Latex Allergy:  [x]NO      []YES  Preferred Language for Healthcare:   [x]English       []other:    Visit # Insurance Allowable Requires auth   15 60    [x]no        []yes:     Pain level: 0/10     SUBJECTIVE: Patient states he just has a little bit of normal soreness. Has been able to participate in work activities more, not as much of the lifting heavy boxes.      OBJECTIVE:   Observation:    Test measurements: NT this date     RESTRICTIONS/PRECAUTIONS:    Exercises/Interventions:    Therapeutic Ex Sets/rep comments         Wall slide flexion and scaption 5\" x 10 ea               +HEP   Standing shoulder flexion 2# 2 x 10    Side lying ER 2# 3 x 10 x 3\" Cues for reaching neutral   Side lying abduction 2# 3 x 10   Eccentric lowering   IR strap stretch 30\" x 3       Inc at next visit      ER stretch doorway 30\" x 3    TB ER  IRGTB 3 x 10  BTB 3 x 104 way ball on wall 2 x 30 ea Red ball   Wall push ups 2 x 10    Prone T and Y w/ LUE only 10x ea Good form, fatigue at end             ATC     Manual Intervention     STM to pec/ lateral arm, PROM flex and ER, post and inf mobs G2 15' Increased motion today                                 NMR re-education     D1 and D2 RTB flex 15x VC for form and scap squeeze                                          Therapeutic Exercise and NMR EXR  [x] (41867) Provided verbal/tactile cueing for activities related to strengthening, flexibility, endurance, ROM  for improvements in scapular, scapulothoracic and UE control with self care, reaching, carrying, lifting, house/yardwork, driving/computer work.    [] (56716) Provided verbal/tactile cueing for activities related to improving balance, coordination, kinesthetic sense, posture, motor skill, proprioception  to assist with  scapular, scapulothoracic and UE control with self care, reaching, carrying, lifting, house/yardwork, driving/computer work. Therapeutic Activities:    [] (80048 or 49828) Provided verbal/tactile cueing for activities related to improving balance, coordination, kinesthetic sense, posture, motor skill, proprioception and motor activation to allow for proper function of scapular, scapulothoracic and UE control with self care, carrying, lifting, driving/computer work.      Home Exercise Program:    [x] (52454) Reviewed/Progressed HEP activities related to strengthening, flexibility, endurance, ROM of scapular, scapulothoracic and UE control with self care, reaching, carrying, lifting, house/yardwork, driving/computer work  [] (55669) Reviewed/Progressed HEP activities related to improving balance, coordination, kinesthetic sense, posture, motor skill, proprioception of scapular, scapulothoracic and UE control with self care, reaching, carrying, lifting, house/yardwork, driving/computer work      Manual Treatments:  PROM / STM / Oscillations-Mobs:  G-I, II, III, IV (PA's, Inf., Post.)  [x] (68375) Provided manual therapy to mobilize soft tissue/joints of cervical/CT, scapular GHJ and UE for the purpose of modulating pain, promoting relaxation,  increasing ROM, progressing       Progression Towards Functional goals:  [x] Patient is progressing as expected towards functional goals listed. [] Progression is slowed due to complexities listed. [] Progression has been slowed due to co-morbidities. [] Plan just implemented, too soon to assess goals progression  [] Other:     ASSESSMENT:  Patient did well with challenging TE today, able to increase band resistance and had good form with all activities.      Treatment/Activity Tolerance:  [x] Patient tolerated treatment well [] Patient limited by fatique  [] Patient limited by pain  [] Patient limited by other medical complications  [] Other:     Prognosis: [x] Good [] Fair  [] Poor    Patient Requires Follow-up: [x] Yes  [] No    PLAN: Continue to strengthen   [x] Continue per plan of care [] Alter current plan (see comments)  [] Plan of care initiated [] Hold pending MD visit [] Discharge    Electronically signed by: Gregor Puente, PT,DPT [id number]

## 2018-01-30 NOTE — FLOWSHEET NOTE
Lat. Pulldown 8pl 2x10 8pl 2x10                               Biceps                                Triceps 6pl 2x10 6pl 2x10         Modality Declined Declined   Initials DB SA    Time spent with PT assistant

## 2018-02-01 ENCOUNTER — HOSPITAL ENCOUNTER (OUTPATIENT)
Dept: PHYSICAL THERAPY | Age: 43
Discharge: HOME | End: 2018-02-02
Admitting: ORTHOPAEDIC SURGERY

## 2018-02-01 ENCOUNTER — HOSPITAL ENCOUNTER (OUTPATIENT)
Dept: PHYSICAL THERAPY | Age: 43
Discharge: HOME | End: 2018-02-14
Attending: ORTHOPAEDIC SURGERY | Admitting: ORTHOPAEDIC SURGERY

## 2018-02-01 NOTE — FLOWSHEET NOTE
flex and ER, post and inf mobs G2 15' Increased motion today                                 NMR re-education     D1 and D2 RTB flex 15x VC for form and scap squeeze                                          Therapeutic Exercise and NMR EXR  [x] (68178) Provided verbal/tactile cueing for activities related to strengthening, flexibility, endurance, ROM  for improvements in scapular, scapulothoracic and UE control with self care, reaching, carrying, lifting, house/yardwork, driving/computer work.    [] (50748) Provided verbal/tactile cueing for activities related to improving balance, coordination, kinesthetic sense, posture, motor skill, proprioception  to assist with  scapular, scapulothoracic and UE control with self care, reaching, carrying, lifting, house/yardwork, driving/computer work. Therapeutic Activities:    [] (72284 or 11677) Provided verbal/tactile cueing for activities related to improving balance, coordination, kinesthetic sense, posture, motor skill, proprioception and motor activation to allow for proper function of scapular, scapulothoracic and UE control with self care, carrying, lifting, driving/computer work.      Home Exercise Program:    [x] (29803) Reviewed/Progressed HEP activities related to strengthening, flexibility, endurance, ROM of scapular, scapulothoracic and UE control with self care, reaching, carrying, lifting, house/yardwork, driving/computer work  [] (19947) Reviewed/Progressed HEP activities related to improving balance, coordination, kinesthetic sense, posture, motor skill, proprioception of scapular, scapulothoracic and UE control with self care, reaching, carrying, lifting, house/yardwork, driving/computer work      Manual Treatments:  PROM / STM / Oscillations-Mobs:  G-I, II, III, IV (PA's, Inf., Post.)  [x] (66586) Provided manual therapy to mobilize soft tissue/joints of cervical/CT, scapular GHJ and UE for the purpose of modulating pain, promoting relaxation,  increasing ROM, reducing/eliminating soft tissue swelling/inflammation/restriction, improving soft tissue extensibility and allowing for proper ROM for normal function with self care, reaching, carrying, lifting, house/yardwork, driving/computer work    Modalities:   Pt declined    Charges:  Timed Code Treatment Minutes: 40   Total Treatment Minutes: 40     [] EVAL (LOW) 32972 (typically 20 minutes face-to-face)  [] EVAL (MOD) 72340 (typically 30 minutes face-to-face)  [] EVAL (HIGH) 90920 (typically 45 minutes face-to-face)  [] RE-EVAL     [x] GW(59190) x  2   [] IONTO  [] NMR (52891) x      [] VASO  [x] Manual (01588) x  1    [] Other:  [] TA x       [] Mech Traction (37870)  [] ES(attended) (41713)      [] ES (un) (24955):     GOALS:  Patient stated goal: 'Have full mobility of arm and return to normal work function\"    Therapist goals for Patient:   Short Term Goals: To be achieved in: 2 weeks  1. Independent in HEP and progression per patient tolerance, in order to prevent re-injury. 2. Patient will have a decrease in pain to facilitate improvement in movement, function, and ADLs as indicated by Functional Deficits. Long Term Goals: To be achieved in: 12weeks  1. Disability index score of 35% or less for the [facility] to assist with reaching prior level of function. 2. Patient will demonstrate increased AROM to 160 deg flexion, 160 deg abduction, T4 ER, T8 IR to allow for proper joint functioning as indicated by patients Functional Deficits. progressing  3. Patient will demonstrate an increase in Strength to 4+/5 into flexion, abduction, IR and ER to allow for proper functional mobility as indicated by patients Functional Deficits. progressing  4. Patient will return to driving and reaching into cabinetswithout increased symptoms or restriction. met  5.  Patient will return to work related activities including lifting without any increased symptoms or restrictions.(patient specific functional goal)

## 2018-02-06 ENCOUNTER — HOSPITAL ENCOUNTER (OUTPATIENT)
Dept: PHYSICAL THERAPY | Age: 43
Discharge: HOME | End: 2018-02-07
Admitting: ORTHOPAEDIC SURGERY

## 2018-02-06 NOTE — FLOWSHEET NOTE
pec/ lateral arm, PROM flex and ER, post and inf mobs G2 10'                                  NMR re-education     D1 and D2 RTB flex 15x VC for form and scap squeeze   Plyo ball throw (yellow) on tramp 15x 3 directions                                      Therapeutic Exercise and NMR EXR  [x] (53959) Provided verbal/tactile cueing for activities related to strengthening, flexibility, endurance, ROM  for improvements in scapular, scapulothoracic and UE control with self care, reaching, carrying, lifting, house/yardwork, driving/computer work.    [] (86280) Provided verbal/tactile cueing for activities related to improving balance, coordination, kinesthetic sense, posture, motor skill, proprioception  to assist with  scapular, scapulothoracic and UE control with self care, reaching, carrying, lifting, house/yardwork, driving/computer work. Therapeutic Activities:    [] (06011 or 82090) Provided verbal/tactile cueing for activities related to improving balance, coordination, kinesthetic sense, posture, motor skill, proprioception and motor activation to allow for proper function of scapular, scapulothoracic and UE control with self care, carrying, lifting, driving/computer work.      Home Exercise Program:    [x] (42805) Reviewed/Progressed HEP activities related to strengthening, flexibility, endurance, ROM of scapular, scapulothoracic and UE control with self care, reaching, carrying, lifting, house/yardwork, driving/computer work  [] (68817) Reviewed/Progressed HEP activities related to improving balance, coordination, kinesthetic sense, posture, motor skill, proprioception of scapular, scapulothoracic and UE control with self care, reaching, carrying, lifting, house/yardwork, driving/computer work      Manual Treatments:  PROM / STM / Oscillations-Mobs:  G-I, II, III, IV (PA's, Inf., Post.)  [x] (50205) Provided manual therapy to mobilize soft tissue/joints of cervical/CT, scapular GHJ and UE for the purpose of

## 2018-02-13 ENCOUNTER — HOSPITAL ENCOUNTER (OUTPATIENT)
Dept: PHYSICAL THERAPY | Age: 43
Discharge: HOME | End: 2018-02-14
Admitting: ORTHOPAEDIC SURGERY

## 2018-02-13 NOTE — DISCHARGE SUMMARY
[facility], [address] [place]  Phone: [phone]  Fax [phone]       Physical Therapy Discharge  Date: 2018        Patient Name:  Emma Headley    :  1975  MRN: [id number]  Referring Physician:Dr. Marisol Julien  Diagnosis: T15.224 (ICD-10-CM) - Hill-Sachs lesion of left shoulder  [x] Surgical [] Conservative  Therapy Diagnosis/Practice Pattern:R shoulder pain (m25.512) s/p Latarjet-[place] procedure on L shoulder DOS 17     Number of Comorbidities:  [x]0     []1-2    []3+  Total number of visits:18   Reporting Period:   Beginning Date:17   End Date:18    OBJECTIVE  Test used Initial score Discharge Score   Pain Summary Pain scale 2/10 0/10   Functional questionnaire QuickDASH 70% 9%         ROM Flexion 90 deg 160 deg    Abduction  deg    IR NT T7    ER 20 deg @ neutral T4 funct  (90 deg @ 90 deg abd PROM)         Strength       Flexion NT 4+/5    Scaption NT 4+/5    IR NT 4+/5    ER NT 4+/5        Functional Limitation G-Code (if applicable):         PT G-Codes  Functional Assessment Tool Used: QuickDASH  Score: 15/55 (9%)  Functional Limitation: Carrying, moving and handling objects  Carrying, Moving and Handling Objects Current Status (): At least 1 percent but less than 20 percent impaired, limited or restricted  Carrying, Moving and Handling Objects Goal Status (): At least 20 percent but less than 40 percent impaired, limited or restricted  Carrying, Moving and Handling Objects Discharge Status ():  At least 1 percent but less than 20 percent impaired, limited or restricted   Test/tests used to determine % limitation: QuickDASH  Actual Score used to drive % [id number]:40/45    Treatment to date:  [x] Therapeutic Exercise    [x] Modalities:  [] Therapeutic Activity             []Ultrasound            [x]Electrical Stimulation  [] Gait Training     []Cervical Traction

## 2018-02-13 NOTE — FLOWSHEET NOTE
w/ LUE only 10x ea Good form, fatigue at end   Swiss ball pushup on wall 20x         ATC     Manual Intervention     STM to pec/ lateral arm, PROM flex and ER, post and inf mobs G2 10'                                  NMR re-education     D1 and D2 RTB flex 15x VC for form and scap squeeze                                     Therapeutic Exercise and NMR EXR  [x] (41741) Provided verbal/tactile cueing for activities related to strengthening, flexibility, endurance, ROM  for improvements in scapular, scapulothoracic and UE control with self care, reaching, carrying, lifting, house/yardwork, driving/computer work.    [] (16122) Provided verbal/tactile cueing for activities related to improving balance, coordination, kinesthetic sense, posture, motor skill, proprioception  to assist with  scapular, scapulothoracic and UE control with self care, reaching, carrying, lifting, house/yardwork, driving/computer work. Therapeutic Activities:    [] (36256 or 73076) Provided verbal/tactile cueing for activities related to improving balance, coordination, kinesthetic sense, posture, motor skill, proprioception and motor activation to allow for proper function of scapular, scapulothoracic and UE control with self care, carrying, lifting, driving/computer work.      Home Exercise Program:    [x] (12397) Reviewed/Progressed HEP activities related to strengthening, flexibility, endurance, ROM of scapular, scapulothoracic and UE control with self care, reaching, carrying, lifting, house/yardwork, driving/computer work   [] (80420) Reviewed/Progressed HEP activities related to improving balance, coordination, kinesthetic sense, posture, motor skill, proprioception of scapular, scapulothoracic and UE control with self care, reaching, carrying, lifting, house/yardwork, driving/computer work      Manual Treatments:  PROM / STM / Oscillations-Mobs:  G-I, II, III, IV (PA's, Inf., Post.)  [x] (20478) Provided manual therapy to mobilize soft tissue/joints of cervical/CT, scapular GHJ and UE for the purpose of modulating pain, promoting relaxation,  increasing ROM, reducing/eliminating soft tissue swelling/inflammation/restriction, improving soft tissue extensibility and allowing for proper ROM for normal function with self care, reaching, carrying, lifting, house/yardwork, driving/computer work    Modalities:   Pt declined    Charges:  Timed Code Treatment Minutes: 50   Total Treatment Minutes: 50     [] EVAL (LOW) 07475 (typically 20 minutes face-to-face)  [] EVAL (MOD) 54879 (typically 30 minutes face-to-face)  [] EVAL (HIGH) 16939 (typically 45 minutes face-to-face)  [] RE-EVAL     [x] NA(79790) x  2   [] IONTO  [x] NMR (95363) x  1   [] VASO  [x] Manual (88131) x  1    [] Other:  [] TA x       [] Mech Traction (68737)  [] ES(attended) (09139)      [] ES (un) (20877):     GOALS:  Patient stated goal: 'Have full mobility of arm and return to normal work function\"    Therapist goals for Patient:   Short Term Goals: To be achieved in: 2 weeks  1. Independent in HEP and progression per patient tolerance, in order to prevent re-injury. 2. Patient will have a decrease in pain to facilitate improvement in movement, function, and ADLs as indicated by Functional Deficits. Long Term Goals: To be achieved in: 12weeks  1. Disability index score of 35% or less for the [facility] to assist with reaching prior level of function. Met  2. Patient will demonstrate increased AROM to 160 deg flexion, 160 deg abduction, T4 ER, T8 IR to allow for proper joint functioning as indicated by patients Functional Deficits. MET  3. Patient will demonstrate an increase in Strength to 4+/5 into flexion, abduction, IR and ER to allow for proper functional mobility as indicated by patients Functional Deficits. MET   4. Patient will return to driving and reaching into cabinets without increased symptoms or restriction. met  5.  Patient will return to work related activities

## 2018-03-23 ENCOUNTER — OFFICE VISIT (OUTPATIENT)
Dept: ORTHOPEDIC SURGERY | Age: 43
End: 2018-03-23

## 2018-03-23 VITALS — HEIGHT: 68 IN | WEIGHT: 160.05 LBS | BODY MASS INDEX: 24.26 KG/M2

## 2018-03-23 DIAGNOSIS — Z98.890 S/P ARTHROSCOPY OF SHOULDER: ICD-10-CM

## 2018-03-23 DIAGNOSIS — M25.512 PAIN IN JOINT OF LEFT SHOULDER REGION: Primary | ICD-10-CM

## 2018-03-23 PROCEDURE — 99213 OFFICE O/P EST LOW 20 MIN: CPT | Performed by: PHYSICIAN ASSISTANT

## 2018-03-23 NOTE — PROGRESS NOTES
HISTORY OF PRESENT ILLNESS: The patient returns today for evaluation of left shoulder 3.5 months after arthroscopic Latarjet-[place] procedure. He reports recovering from this procedure quite well and has had no instability events since the date of surgery. REVIEW OF SYSTEMS:  Pertinent items are noted in the HPI. Review of systems was reviewed from Patient History Form dated on September 15, 2017 and available in the patient's chart under the Media tab. PHYSICAL EXAMINATION: Inspection of the left shoulder reveals warm, dry, intact skin. There is no adenopathy. The distal neurovascular exam is grossly intact. There is no palpable tenderness. Range of motion reveals 150° of active forward elevation. He is able position his hands behind his head. He has 30° of external rotation with his arm at his side. He has 90° of shoulder abduction. He has 90° of external rotation and 20° of internal rotation with his arm maximally abducted. He has 5 minus out of 5 strength in forward elevation and external rotation. Examination of the contralateral shoulder reveals no atrophy or deformity. The skin is warm and dry. Range of motion is within normal limits. There is no focal tenderness with palpation. Provocative SLAP, biceps tension, apprehension AC joint or rotator cuff tests are negative. Strength is graded 5/5 in all muscle groups. The distal neurovascular exam is grossly intact. Cervical spine: The skin is warm and dry. There is no swelling, warmth, or erythema. Range of motion is within normal limits. There is no paraspinal or spinous process tenderness. Spurling's sign is negative and did not produce shoulder pain. The distal neurovascular exam is grossly intact. X-RAYS:  3 views of the left shoulder including a [place] were obtained. The graft and hardware are well-positioned.     ASSESSMENT:   Doing well after left shoulder arthroscopic Latarjet-Nohemi procedure    PLAN:  I had a detailed

## 2018-04-04 ENCOUNTER — TELEPHONE (OUTPATIENT)
Dept: ORTHOPEDIC SURGERY | Age: 43
End: 2018-04-04

## 2018-04-20 ENCOUNTER — OFFICE VISIT (OUTPATIENT)
Dept: ORTHOPEDIC SURGERY | Age: 43
End: 2018-04-20

## 2018-04-20 VITALS — WEIGHT: 160.05 LBS | BODY MASS INDEX: 24.26 KG/M2 | HEIGHT: 68 IN

## 2018-04-20 DIAGNOSIS — Z98.890 S/P ARTHROSCOPY OF SHOULDER: Primary | ICD-10-CM

## 2018-04-20 PROCEDURE — 99213 OFFICE O/P EST LOW 20 MIN: CPT | Performed by: PHYSICIAN ASSISTANT

## 2019-10-29 ENCOUNTER — OFFICE VISIT (OUTPATIENT)
Dept: ORTHOPEDIC SURGERY | Age: 44
End: 2019-10-29
Payer: COMMERCIAL

## 2019-10-29 VITALS
DIASTOLIC BLOOD PRESSURE: 80 MMHG | SYSTOLIC BLOOD PRESSURE: 134 MMHG | HEIGHT: 68 IN | WEIGHT: 160.05 LBS | HEART RATE: 76 BPM | BODY MASS INDEX: 24.26 KG/M2

## 2019-10-29 DIAGNOSIS — M72.2 BILATERAL PLANTAR FASCIITIS: ICD-10-CM

## 2019-10-29 DIAGNOSIS — M79.672 PAIN OF LEFT HEEL: Primary | ICD-10-CM

## 2019-10-29 DIAGNOSIS — M79.671 PAIN OF RIGHT HEEL: ICD-10-CM

## 2019-10-29 DIAGNOSIS — M79.671 RIGHT FOOT PAIN: ICD-10-CM

## 2019-10-29 PROCEDURE — 99203 OFFICE O/P NEW LOW 30 MIN: CPT | Performed by: PODIATRIST

## 2019-10-29 PROCEDURE — L3040 FT ARCH SUPRT PREMOLD LONGIT: HCPCS | Performed by: PODIATRIST

## 2019-10-29 RX ORDER — SIMVASTATIN 20 MG
20 TABLET ORAL
COMMUNITY